# Patient Record
Sex: FEMALE | Race: WHITE | NOT HISPANIC OR LATINO
[De-identification: names, ages, dates, MRNs, and addresses within clinical notes are randomized per-mention and may not be internally consistent; named-entity substitution may affect disease eponyms.]

---

## 2023-06-26 ENCOUNTER — TRANSCRIPTION ENCOUNTER (OUTPATIENT)
Age: 48
End: 2023-06-26

## 2023-06-27 ENCOUNTER — INPATIENT (INPATIENT)
Facility: HOSPITAL | Age: 48
LOS: 0 days | Discharge: HOME | DRG: 812 | End: 2023-06-27
Attending: OBSTETRICS & GYNECOLOGY | Admitting: OBSTETRICS & GYNECOLOGY
Payer: COMMERCIAL

## 2023-06-27 ENCOUNTER — TRANSCRIPTION ENCOUNTER (OUTPATIENT)
Age: 48
End: 2023-06-27

## 2023-06-27 VITALS
RESPIRATION RATE: 16 BRPM | DIASTOLIC BLOOD PRESSURE: 67 MMHG | SYSTOLIC BLOOD PRESSURE: 106 MMHG | HEART RATE: 80 BPM | OXYGEN SATURATION: 99 % | TEMPERATURE: 99 F

## 2023-06-27 VITALS
DIASTOLIC BLOOD PRESSURE: 83 MMHG | HEART RATE: 103 BPM | TEMPERATURE: 98 F | RESPIRATION RATE: 17 BRPM | SYSTOLIC BLOOD PRESSURE: 121 MMHG | HEIGHT: 65 IN | WEIGHT: 119.93 LBS | OXYGEN SATURATION: 99 %

## 2023-06-27 LAB
ACANTHOCYTES BLD QL SMEAR: SLIGHT — SIGNIFICANT CHANGE UP
ALBUMIN SERPL ELPH-MCNC: 4.4 G/DL — SIGNIFICANT CHANGE UP (ref 3.3–5)
ALP SERPL-CCNC: 70 U/L — SIGNIFICANT CHANGE UP (ref 40–120)
ALT FLD-CCNC: 10 U/L — SIGNIFICANT CHANGE UP (ref 10–45)
ANION GAP SERPL CALC-SCNC: 8 MMOL/L — SIGNIFICANT CHANGE UP (ref 5–17)
ANISOCYTOSIS BLD QL: SLIGHT — SIGNIFICANT CHANGE UP
APTT BLD: 26.1 SEC — LOW (ref 27.5–35.5)
AST SERPL-CCNC: 17 U/L — SIGNIFICANT CHANGE UP (ref 10–40)
BASOPHILS # BLD AUTO: 0 K/UL — SIGNIFICANT CHANGE UP (ref 0–0.2)
BASOPHILS NFR BLD AUTO: 0 % — SIGNIFICANT CHANGE UP (ref 0–2)
BILIRUB SERPL-MCNC: 0.4 MG/DL — SIGNIFICANT CHANGE UP (ref 0.2–1.2)
BLD GP AB SCN SERPL QL: NEGATIVE — SIGNIFICANT CHANGE UP
BUN SERPL-MCNC: 15 MG/DL — SIGNIFICANT CHANGE UP (ref 7–23)
CALCIUM SERPL-MCNC: 8.6 MG/DL — SIGNIFICANT CHANGE UP (ref 8.4–10.5)
CHLORIDE SERPL-SCNC: 104 MMOL/L — SIGNIFICANT CHANGE UP (ref 96–108)
CO2 SERPL-SCNC: 25 MMOL/L — SIGNIFICANT CHANGE UP (ref 22–31)
CREAT SERPL-MCNC: 0.77 MG/DL — SIGNIFICANT CHANGE UP (ref 0.5–1.3)
DACRYOCYTES BLD QL SMEAR: SLIGHT — SIGNIFICANT CHANGE UP
EGFR: 96 ML/MIN/1.73M2 — SIGNIFICANT CHANGE UP
ELLIPTOCYTES BLD QL SMEAR: SLIGHT — SIGNIFICANT CHANGE UP
EOSINOPHIL # BLD AUTO: 0.03 K/UL — SIGNIFICANT CHANGE UP (ref 0–0.5)
EOSINOPHIL NFR BLD AUTO: 0.8 % — SIGNIFICANT CHANGE UP (ref 0–6)
GIANT PLATELETS BLD QL SMEAR: PRESENT — SIGNIFICANT CHANGE UP
GLUCOSE SERPL-MCNC: 92 MG/DL — SIGNIFICANT CHANGE UP (ref 70–99)
HCG SERPL-ACNC: <0 MIU/ML — SIGNIFICANT CHANGE UP
HCT VFR BLD CALC: 20 % — CRITICAL LOW (ref 34.5–45)
HGB BLD-MCNC: 6.1 G/DL — CRITICAL LOW (ref 11.5–15.5)
HYPOCHROMIA BLD QL: SIGNIFICANT CHANGE UP
INR BLD: 1.03 — SIGNIFICANT CHANGE UP (ref 0.88–1.16)
LYMPHOCYTES # BLD AUTO: 1.85 K/UL — SIGNIFICANT CHANGE UP (ref 1–3.3)
LYMPHOCYTES # BLD AUTO: 44 % — SIGNIFICANT CHANGE UP (ref 13–44)
MACROCYTES BLD QL: SLIGHT — SIGNIFICANT CHANGE UP
MANUAL SMEAR VERIFICATION: SIGNIFICANT CHANGE UP
MCHC RBC-ENTMCNC: 23.7 PG — LOW (ref 27–34)
MCHC RBC-ENTMCNC: 30.5 GM/DL — LOW (ref 32–36)
MCV RBC AUTO: 77.8 FL — LOW (ref 80–100)
MICROCYTES BLD QL: SLIGHT — SIGNIFICANT CHANGE UP
MONOCYTES # BLD AUTO: 0.11 K/UL — SIGNIFICANT CHANGE UP (ref 0–0.9)
MONOCYTES NFR BLD AUTO: 2.6 % — SIGNIFICANT CHANGE UP (ref 2–14)
NEUTROPHILS # BLD AUTO: 2.21 K/UL — SIGNIFICANT CHANGE UP (ref 1.8–7.4)
NEUTROPHILS NFR BLD AUTO: 52.6 % — SIGNIFICANT CHANGE UP (ref 43–77)
OVALOCYTES BLD QL SMEAR: SIGNIFICANT CHANGE UP
PLAT MORPH BLD: NORMAL — SIGNIFICANT CHANGE UP
PLATELET # BLD AUTO: 442 K/UL — HIGH (ref 150–400)
POIKILOCYTOSIS BLD QL AUTO: SLIGHT — SIGNIFICANT CHANGE UP
POLYCHROMASIA BLD QL SMEAR: SLIGHT — SIGNIFICANT CHANGE UP
POTASSIUM SERPL-MCNC: 3.8 MMOL/L — SIGNIFICANT CHANGE UP (ref 3.5–5.3)
POTASSIUM SERPL-SCNC: 3.8 MMOL/L — SIGNIFICANT CHANGE UP (ref 3.5–5.3)
PROT SERPL-MCNC: 6.6 G/DL — SIGNIFICANT CHANGE UP (ref 6–8.3)
PROTHROM AB SERPL-ACNC: 12.3 SEC — SIGNIFICANT CHANGE UP (ref 10.5–13.4)
RBC # BLD: 2.57 M/UL — LOW (ref 3.8–5.2)
RBC # FLD: 17 % — HIGH (ref 10.3–14.5)
RBC BLD AUTO: ABNORMAL
RH IG SCN BLD-IMP: POSITIVE — SIGNIFICANT CHANGE UP
RH IG SCN BLD-IMP: POSITIVE — SIGNIFICANT CHANGE UP
SODIUM SERPL-SCNC: 137 MMOL/L — SIGNIFICANT CHANGE UP (ref 135–145)
SPHEROCYTES BLD QL SMEAR: SLIGHT — SIGNIFICANT CHANGE UP
WBC # BLD: 4.2 K/UL — SIGNIFICANT CHANGE UP (ref 3.8–10.5)
WBC # FLD AUTO: 4.2 K/UL — SIGNIFICANT CHANGE UP (ref 3.8–10.5)

## 2023-06-27 PROCEDURE — 76830 TRANSVAGINAL US NON-OB: CPT

## 2023-06-27 PROCEDURE — 76856 US EXAM PELVIC COMPLETE: CPT | Mod: 26

## 2023-06-27 PROCEDURE — 85025 COMPLETE CBC W/AUTO DIFF WBC: CPT

## 2023-06-27 PROCEDURE — 99285 EMERGENCY DEPT VISIT HI MDM: CPT

## 2023-06-27 PROCEDURE — 86901 BLOOD TYPING SEROLOGIC RH(D): CPT

## 2023-06-27 PROCEDURE — 86850 RBC ANTIBODY SCREEN: CPT

## 2023-06-27 PROCEDURE — 76856 US EXAM PELVIC COMPLETE: CPT

## 2023-06-27 PROCEDURE — 85730 THROMBOPLASTIN TIME PARTIAL: CPT

## 2023-06-27 PROCEDURE — P9016: CPT

## 2023-06-27 PROCEDURE — 99285 EMERGENCY DEPT VISIT HI MDM: CPT | Mod: 25

## 2023-06-27 PROCEDURE — 86923 COMPATIBILITY TEST ELECTRIC: CPT

## 2023-06-27 PROCEDURE — 85610 PROTHROMBIN TIME: CPT

## 2023-06-27 PROCEDURE — 36415 COLL VENOUS BLD VENIPUNCTURE: CPT

## 2023-06-27 PROCEDURE — 80053 COMPREHEN METABOLIC PANEL: CPT

## 2023-06-27 PROCEDURE — 84702 CHORIONIC GONADOTROPIN TEST: CPT

## 2023-06-27 PROCEDURE — 36430 TRANSFUSION BLD/BLD COMPNT: CPT

## 2023-06-27 PROCEDURE — 86900 BLOOD TYPING SEROLOGIC ABO: CPT

## 2023-06-27 PROCEDURE — 76830 TRANSVAGINAL US NON-OB: CPT | Mod: 26

## 2023-06-27 NOTE — CONSULT NOTE ADULT - SUBJECTIVE AND OBJECTIVE BOX
47y  presenting with anemia due to vaginal bleeding for six weeks. Patient started a menstrual cycle on  and has continued to bleed heavily since. She had a short break from bleeding from -, then it resumed. She reports using an ultra tampon every 2 hours. The tampon is not always soaked but it is bright red blood. She had also noticed some tissue pieces with the bleeding in May. Yesterday she was feeling very lightheaded and SOB as well as fatigued. She went to an urgent care, where a speculum exam was performed and wnl. Labs were drawn and the patient went home. She awoke at 01:30 to the police knocking on her door with an ambulance, as a nurse at urgent care had attempted to call her without response. Patient was shaken but otherwise still feeling fatigued.     OB H/x: G1- c/s   GYN H/x: Denies hx of fibroids, abnormal pap smears, STIs, ovarian cysts. Regular menses prior to this episode.    MED H/x: none  SURG H/x: none  Medications: ozempic weekly (for weight loss)  Allergies: NKDA    Vital Signs Last 24 Hrs  T(C): 36.8 (2023 02:29), Max: 36.8 (2023 02:29)  T(F): 98.3 (2023 02:29), Max: 98.3 (2023 02:29)  HR: 103 (2023 02:29) (103 - 103)  BP: 121/83 (2023 02:29) (121/83 - 121/83)  BP(mean): --  RR: 17 (2023 02:29) (17 - 17)  SpO2: 99% (2023 02:29) (99% - 99%)    Parameters below as of 2023 02:29  Patient On (Oxygen Delivery Method): room air        Physical Exam:  Gen: NAD, comfortable  GI: soft, nontender, nondistended, no rebound no guarding  BME: normal anteverted uterus, no adnexal masses appreciated , No cervical motion tenderness   Spec: closed cervix without active bleeding, minimal blood in vault  Ext: no edema, erythema, tenderness     LABS:                        6.1    4.20  )-----------( 442      ( 2023 03:08 )             20.0     06-    137  |  104  |  15  ----------------------------<  92  3.8   |  25  |  0.77    Ca    8.6      2023 03:08    TPro  6.6  /  Alb  4.4  /  TBili  0.4  /  DBili  x   /  AST  17  /  ALT  10  /  AlkPhos  70      PT/INR - ( 2023 03:08 )   PT: 12.3 sec;   INR: 1.03          PTT - ( 2023 03:08 )  PTT:26.1 sec  Urinalysis Basic - ( 2023 03:08 )    Color: x / Appearance: x / SG: x / pH: x  Gluc: 92 mg/dL / Ketone: x  / Bili: x / Urobili: x   Blood: x / Protein: x / Nitrite: x   Leuk Esterase: x / RBC: x / WBC x   Sq Epi: x / Non Sq Epi: x / Bacteria: x        RADIOLOGY & ADDITIONAL STUDIES:     47y  presenting with anemia due to vaginal bleeding for six weeks. Patient started a menstrual cycle on  and has continued to bleed heavily since. She had a short break from bleeding from -, then it resumed. She reports using an ultra tampon every 2 hours. The tampon is not always soaked but it is bright red blood. She had also noticed some tissue pieces with the bleeding in May. Yesterday she was feeling very lightheaded and SOB as well as fatigued. She went to an urgent care, where a speculum exam was performed and wnl. Labs were drawn and the patient went home. She awoke at 01:30 to the police knocking on her door with an ambulance, as a nurse at urgent care had attempted to call her without response. Patient was shaken but otherwise still feeling fatigued.     OB H/x: G1- c/s   GYN H/x: Denies hx of fibroids, abnormal pap smears, STIs, ovarian cysts. Regular menses prior to this episode.    MED H/x: none  SURG H/x: none  Medications: ozempic weekly (for weight loss)  Allergies: NKDA    Vital Signs Last 24 Hrs  T(C): 36.8 (2023 02:29), Max: 36.8 (2023 02:29)  T(F): 98.3 (2023 02:29), Max: 98.3 (2023 02:29)  HR: 103 (2023 02:29) (103 - 103)  BP: 121/83 (2023 02:29) (121/83 - 121/83)  BP(mean): --  RR: 17 (2023 02:29) (17 - 17)  SpO2: 99% (2023 02:29) (99% - 99%)    Parameters below as of 2023 02:29  Patient On (Oxygen Delivery Method): room air        Physical Exam:  Gen: NAD, comfortable  GI: soft, nontender, nondistended, no rebound no guarding  BME: normal anteverted uterus, no adnexal masses appreciated , No cervical motion tenderness   Spec: closed cervix without active bleeding, minimal blood in vault  Ext: no edema, erythema, tenderness     LABS:                        6.1    4.20  )-----------( 442      ( 2023 03:08 )             20.0         137  |  104  |  15  ----------------------------<  92  3.8   |  25  |  0.77    Ca    8.6      2023 03:08    TPro  6.6  /  Alb  4.4  /  TBili  0.4  /  DBili  x   /  AST  17  /  ALT  10  /  AlkPhos  70      PT/INR - ( 2023 03:08 )   PT: 12.3 sec;   INR: 1.03          PTT - ( 2023 03:08 )  PTT:26.1 sec  Urinalysis Basic - ( 2023 03:08 )    Color: x / Appearance: x / SG: x / pH: x  Gluc: 92 mg/dL / Ketone: x  / Bili: x / Urobili: x   Blood: x / Protein: x / Nitrite: x   Leuk Esterase: x / RBC: x / WBC x   Sq Epi: x / Non Sq Epi: x / Bacteria: x        RADIOLOGY & ADDITIONAL STUDIES:  < from: US Transvaginal (23 @ 05:18) >  ******PRELIMINARY REPORT******      ******PRELIMINARY REPORT******         ACC: [id number] EXAM:  US PELVIC COMPLETE   ORDERED BY: SOO ROBERTO     ACC: [id number] EXAM:  US TRANSVAGINAL   ORDERED BY: SOO ROBERTO     PROCEDURE DATE:  2023  ******PRELIMINARY REPORT******      ******PRELIMINARY REPORT******           INTERPRETATION:  CLINICAL INFORMATION: Vaginal bleeding    LMP: 2023    COMPARISON: None available.    TECHNIQUE:  Endovaginal and transabdominal pelvic sonogram. Color and Spectral   Doppler was performed.    FINDINGS:  Uterus: 8.8 x 5.2 x 6.6 cm. Within normal limits.  Endometrium: Thickened, 2.3 cm. There is a 1.0 x 0.4 cm hyperechoic focus   in the anterior endometrium with suggestion of a vascular stalk on color   Doppler imaging.  Cervix: A few small nabothian cysts.    Right ovary: 5.2 x 3.5 x 3.8 cm, volume 36 mL. 3.6 x 3.0 x 3.1 cm simple   cyst. Normal arterial and venous waveforms.  Left ovary: 3.5 x 2.0 x 2.7 cm, volume 10 mL. Within normal limits.   Normal arterial and venous waveforms.    Fluid: None.    IMPRESSION:    1. Thickened endometrium with asymmetric 1.0 cm hyperechoic focus   demonstrating a vascular stalk on color Doppler, suggestive of   endometrial polyp.    2. Right ovarian cyst,3.6cm.    < end of copied text >

## 2023-06-27 NOTE — H&P ADULT - HISTORY OF PRESENT ILLNESS
47y  presenting with anemia due to vaginal bleeding for six weeks. Patient started a menstrual cycle on  and has continued to bleed heavily since. She had a short break from bleeding from -, then it resumed. She reports using an ultra tampon every 2 hours. The tampon is not always soaked but it is bright red blood. She had also noticed some tissue pieces with the bleeding in May. Yesterday she was feeling very lightheaded and SOB as well as fatigued. She went to an urgent care, where a speculum exam was performed and wnl. Labs were drawn and the patient went home. She awoke at 01:30 to the police knocking on her door with an ambulance, as a nurse at urgent care had attempted to call her without response. Patient was shaken but otherwise still feeling fatigued.     OB H/x: G1- c/s   GYN H/x: Denies hx of fibroids, abnormal pap smears, STIs, ovarian cysts. Regular menses prior to this episode.    MED H/x: none  SURG H/x: none  Medications: ozempic weekly (for weight loss)  Allergies: NKDA

## 2023-06-27 NOTE — ED PROVIDER NOTE - NS ED ATTENDING STATEMENT MOD
This was a shared visit with the HOMERO. I reviewed and verified the documentation and independently performed the documented:

## 2023-06-27 NOTE — ED ADULT TRIAGE NOTE - CHIEF COMPLAINT QUOTE
Pt presetns ambulatory via EMS from home. Seen at  yesterday (6/26) for c/o "vaginal bleeding x 6 weeks". Pt denies any pain or cramping, states "I just thought I was pippa-menopausal". Notified this AM that her Hg low and referred to ER.

## 2023-06-27 NOTE — H&P ADULT - NSHPPHYSICALEXAM_GEN_ALL_CORE
Vital Signs Last 24 Hrs  T(C): 36.8 (27 Jun 2023 02:29), Max: 36.8 (27 Jun 2023 02:29)  T(F): 98.3 (27 Jun 2023 02:29), Max: 98.3 (27 Jun 2023 02:29)  HR: 103 (27 Jun 2023 02:29) (103 - 103)  BP: 121/83 (27 Jun 2023 02:29) (121/83 - 121/83)  BP(mean): --  RR: 17 (27 Jun 2023 02:29) (17 - 17)  SpO2: 99% (27 Jun 2023 02:29) (99% - 99%)    Parameters below as of 27 Jun 2023 02:29  Patient On (Oxygen Delivery Method): room air        Physical Exam:  Gen: NAD, comfortable  GI: soft, nontender, nondistended, no rebound no guarding  BME: normal anteverted uterus, no adnexal masses appreciated , No cervical motion tenderness   Spec: closed cervix without active bleeding, minimal blood in vault  Ext: no edema, erythema, tenderness

## 2023-06-27 NOTE — ED ADULT NURSE NOTE - OBJECTIVE STATEMENT
Pt presents to ER c/o abnormal Hgb (lab draw from urgent care) d/t vaginal bleeding for ~6 weeks., Pt A&O x3, calm and cooperative, airway patent, respirations regular, non-labored, lungs clear bilaterally to auscultation, abdomen soft non-tender, normoactive bowel sounds noted in all quadrants, strong palpable peripheral pulses noted, skin warm dry intact. Pt waiting ER provider evaluation.

## 2023-06-27 NOTE — ED ADULT NURSE NOTE - NSFALLUNIVINTERV_ED_ALL_ED
Bed/Stretcher in lowest position, wheels locked, appropriate side rails in place/Call bell, personal items and telephone in reach/Instruct patient to call for assistance before getting out of bed/chair/stretcher/Non-slip footwear applied when patient is off stretcher/[place] to call system/Physically safe environment - no spills, clutter or unnecessary equipment/Purposeful proactive rounding/Room/bathroom lighting operational, light cord in reach

## 2023-06-27 NOTE — DISCHARGE NOTE PROVIDER - NSDCFUADDINST_GEN_ALL_CORE_FT
-follow up with Dr. Arroyo at appointment on 7/5  -ultrasound showed possible endometrial polyp, consider hysteroscopic evaluation

## 2023-06-27 NOTE — ED ADULT NURSE NOTE - SUICIDE SCREENING QUESTION 1
PATIENT HAS BEEN TRYING TO GET [facility] CALLED INTO [facility] [phone]. PLEASE CALL IN TODAY   No

## 2023-06-27 NOTE — DISCHARGE NOTE PROVIDER - HOSPITAL COURSE
47y  presenting with anemia due to vaginal bleeding for six weeks, found to have Hgb 6.1 and TVUS with thickened endometrium and endometrial polyp. She received 2 U pRBC and was discharged clinically and hemodynamically stable with outpatient follow up scheduled  with Dr. Arroyo.

## 2023-06-27 NOTE — ED PROVIDER NOTE - PROGRESS NOTE DETAILS
hgb 6.1 / hct 20. labs otherwise ok.   US "IMPRESSION:  1. Thickened endometrium with asymmetric 1.0 cm hyperechoic focus   demonstrating a vascular stalk on color Doppler, suggestive of   endometrial polyp.  2. Right ovarian cyst, 3.6cm."    discussed w gyn. will transfuse 2un. vitals stable. pt not actively bleeding on gyn exam.     All results reviewed with the patient verbally. Discharge plan and return precautions d/w pt who verbalized understanding and agrees with plan. All questions answered. Vitals WNL. Ready for d/c.

## 2023-06-27 NOTE — ED PROVIDER NOTE - PHYSICAL EXAMINATION
Constitutional : Well appearing, non-toxic, no acute distress. awake, alert, oriented to person, place, time/situation.  Head : head normocephalic, atraumatic  EENMT : eyes clear bilaterally, PERRL, EOMI. airway patent. moist mucous membranes. neck supple.  Cardiac : Normal rate, regular rhythm. No murmur appreciated, no LE edema.  Resp : Breath sounds clear and equal bilaterally. Respirations even and unlabored.   Gastro : abdomen soft, nontender, nondistended. no rebound or guarding.   MSK :  range of motion is not limited, no muscle or joint tenderness  Vasc : Extremities warm and well perfused. 2+ radial and DP pulses. cap refill <2 seconds  Neuro : Alert and oriented, CNII-XII grossly intact, no focal deficits, no motor or sensory deficits.  Skin : Skin normal color for race, warm, dry and intact. No evidence of rash.  Psych : Alert and oriented to person, place, time/situation. normal mood and affect. no apparent risk to self or others.

## 2023-06-27 NOTE — DISCHARGE NOTE NURSING/CASE MANAGEMENT/SOCIAL WORK - PATIENT PORTAL LINK FT
You can access the FollowMyHealth Patient Portal offered by [facility] by registering at the following website: [email or web address]. By joining [email or web address] FollowMyHealth portal, you will also be able to view your health information using other applications (apps) compatible with our system.

## 2023-06-27 NOTE — ED PROVIDER NOTE - CLINICAL SUMMARY MEDICAL DECISION MAKING FREE TEXT BOX
otherwise healthy, here w ongoing vaginal bleeding x 6 weeks. feeling fatigued, lightheaded. outpt labs from urgent care yesterday showing low hemoglobin.   pt well appearing, stable vitals, exam reassuring - pelvic deferred.   anemia likely 2/2 vaginal bleeding  will check labs, transfuse prn

## 2023-06-27 NOTE — ED PROVIDER NOTE - OBJECTIVE STATEMENT
47 yr old female, denies medical history, presents to the Emergency Department w vaginal bleeding / anemia. 47 yr old female, denies medical history, presents to the Emergency Department w vaginal bleeding / anemia. pt w ongoing vaginal bleeding x 6 weeks. initially started menses on 5/9, ongoing bleeding since. had few days of not bleeding beginning of june. in last few weeks changing tampon q few hours. not always fully saturated but bright red blood and intermittently having some tissue pass. yesterday feeling lightheaded and fatigued. went to urgent care had labs done. woken overnight by police sent from urgent care for abnormal labs showing low hemoglobin. previously regular menses w/o heavy bleeding.  no cp, sob, syncope, dark / bloody stools.

## 2023-06-27 NOTE — H&P ADULT - NSHPLABSRESULTS_GEN_ALL_CORE
LABS:                        6.1    4.20  )-----------( 442      ( 27 Jun 2023 03:08 )             20.0     06-27    137  |  104  |  15  ----------------------------<  92  3.8   |  25  |  0.77    Ca    8.6      27 Jun 2023 03:08    TPro  6.6  /  Alb  4.4  /  TBili  0.4  /  DBili  x   /  AST  17  /  ALT  10  /  AlkPhos  70  06-27    PT/INR - ( 27 Jun 2023 03:08 )   PT: 12.3 sec;   INR: 1.03          PTT - ( 27 Jun 2023 03:08 )  PTT:26.1 sec  Urinalysis Basic - ( 27 Jun 2023 03:08 )    Color: x / Appearance: x / SG: x / pH: x  Gluc: 92 mg/dL / Ketone: x  / Bili: x / Urobili: x   Blood: x / Protein: x / Nitrite: x   Leuk Esterase: x / RBC: x / WBC x   Sq Epi: x / Non Sq Epi: x / Bacteria: x        RADIOLOGY & ADDITIONAL STUDIES:  < from: US Transvaginal (06.27.23 @ 05:18) >  ******PRELIMINARY REPORT******      ******PRELIMINARY REPORT******         ACC: [id number] EXAM:  US PELVIC COMPLETE   ORDERED BY: SOO ROBERTO     ACC: [id number] EXAM:  US TRANSVAGINAL   ORDERED BY: SOO ROBERTO     PROCEDURE DATE:  06/27/2023  ******PRELIMINARY REPORT******      ******PRELIMINARY REPORT******           INTERPRETATION:  CLINICAL INFORMATION: Vaginal bleeding    LMP: 5/9/2023    COMPARISON: None available.    TECHNIQUE:  Endovaginal and transabdominal pelvic sonogram. Color and Spectral   Doppler was performed.    FINDINGS:  Uterus: 8.8 x 5.2 x 6.6 cm. Within normal limits.  Endometrium: Thickened, 2.3 cm. There is a 1.0 x 0.4 cm hyperechoic focus   in the anterior endometrium with suggestion of a vascular stalk on color   Doppler imaging.  Cervix: A few small nabothian cysts.    Right ovary: 5.2 x 3.5 x 3.8 cm, volume 36 mL. 3.6 x 3.0 x 3.1 cm simple   cyst. Normal arterial and venous waveforms.  Left ovary: 3.5 x 2.0 x 2.7 cm, volume 10 mL. Within normal limits.   Normal arterial and venous waveforms.    Fluid: None.    IMPRESSION:    1. Thickened endometrium with asymmetric 1.0 cm hyperechoic focus   demonstrating a vascular stalk on color Doppler, suggestive of   endometrial polyp.    2. Right ovarian cyst,3.6cm.

## 2023-06-27 NOTE — CONSULT NOTE ADULT - ASSESSMENT
47y  presenting with anemia due to vaginal bleeding for six weeks.    -pending TVUS    ---INCOMPLETE--- 47y  presenting with anemia due to vaginal bleeding for six weeks, found to have Hgb 6.1 and TVUS with thickened endometrium and endometrial polyp, clinically and hemodynamically stable.    #anemia 2/2 vaginal bleeding  -patient without heavy active bleeding at this time; would not recommend further medical management     47y  presenting with anemia due to vaginal bleeding for six weeks, found to have Hgb 6.1 and TVUS with thickened endometrium and endometrial polyp, clinically and hemodynamically stable.    #anemia 2/2 vaginal bleeding  -patient without heavy active bleeding at this time; would not recommend further medical management  -TVUS prelim read with thickened endometrium and vascularity suggestive of endometrial polyp  -recommend 2 U pRBC  -admit to GYN under Dana Newman MD  -patient to f/u outpatient with Dr. Arroyo (GYN) on ; recommend discussion of management of endometrial polyp    D/W Dr. Newman and Dr. Hines, PGY3

## 2023-06-27 NOTE — H&P ADULT - ASSESSMENT
47y  presenting with anemia due to vaginal bleeding for six weeks, found to have Hgb 6.1 and TVUS with thickened endometrium and endometrial polyp, clinically and hemodynamically stable.    #anemia 2/2 vaginal bleeding  -patient without heavy active bleeding at this time; would not recommend further medical management  -TVUS prelim read with thickened endometrium and vascularity suggestive of endometrial polyp  -recommend 2 U pRBC  -admit to GYN under Dana Newman MD  -patient to f/u outpatient with Dr. Arroyo (GYN) on ; recommend discussion of management of endometrial polyp    D/W Dr. Newman and Dr. Hines, PGY3 47y  presenting with anemia due to vaginal bleeding for six weeks, found to have Hgb 6.1 and TVUS with thickened endometrium and endometrial polyp, clinically and hemodynamically stable.    #anemia 2/2 vaginal bleeding  -patient without heavy active bleeding at this time; would not recommend further medical management  -TVUS prelim read with thickened endometrium and vascularity suggestive of endometrial polyp  -recommend 2 U pRBC  -admit to GYN under Dana Newman MD  -patient to f/u outpatient with Dr. Arroyo (GYN) on ; recommend discussion of management of endometrial polyp    D/W Dr. Newman and Dr. Hines, PGY3      Addendum:   Pt seen at bedside at completion of 2UPRBCs. Pt feeling very well and has been ambulating without difficulty around the ER. Pt denies dizziness, SOB, CP at this time. Pt informed of her TVUS results and understands that outpatient follow up is necessary to manage polyp.   [] Pt may be discharged home w/ return precautions - SOB, CP, vaginal bleeding (more than a pad per hour x 2 hours), dizziness, syncope  [] Pt has follow up appointment with her OB/GYN on 23. Pt given info for [facility] (Insurance is [employer])- Dr. Cook #[phone]  [] Pt understands counseling and importance of follow up and plans to see her doctor within 1 week to schedule outpatient management

## 2023-06-27 NOTE — DISCHARGE NOTE NURSING/CASE MANAGEMENT/SOCIAL WORK - NSDCPEFALRISK_GEN_ALL_CORE
For information on Fall & Injury Prevention, visit: [email or web address] OR  [email or web address] OR  https://www.cdc.gov/steadi/patient.html

## 2023-07-03 ENCOUNTER — APPOINTMENT (OUTPATIENT)
Dept: OBGYN | Facility: CLINIC | Age: 48
End: 2023-07-03
Payer: COMMERCIAL

## 2023-07-03 VITALS — DIASTOLIC BLOOD PRESSURE: 76 MMHG | HEART RATE: 106 BPM | SYSTOLIC BLOOD PRESSURE: 115 MMHG | OXYGEN SATURATION: 99 %

## 2023-07-03 DIAGNOSIS — Z92.89 PERSONAL HISTORY OF OTHER MEDICAL TREATMENT: ICD-10-CM

## 2023-07-03 DIAGNOSIS — N93.9 ABNORMAL UTERINE AND VAGINAL BLEEDING, UNSPECIFIED: ICD-10-CM

## 2023-07-03 PROCEDURE — 58100 BIOPSY OF UTERUS LINING: CPT | Mod: 58,22

## 2023-07-03 PROCEDURE — 99203 OFFICE O/P NEW LOW 30 MIN: CPT | Mod: 25

## 2023-07-03 NOTE — PROCEDURE
[Endometrial Biopsy] : Endometrial biopsy [Consent Obtained] : Consent obtained [Irregular Bleeding] : irregular uterine bleeding [Risks] : risks [Benefits] : benefits [Alternatives] : alternatives [Patient] : patient [Infection] : infection [Uterine Perforation] : uterine perforation [Pain] : pain [Negative] : negative pregnancy test [Ibuprofen ___ mg] : ibuprofen [unfilled] ~Umg [Betadine] : Betadine [None] : none [Tenaculum] : Tenaculum [Required Dilation] : required dilation [Moderate] : moderate [Specimen Collected] : collected [Sent to Pathology] : placed in buffered formalin and sent for pathology [Tolerated Well] : Patient tolerated the procedure well [de-identified] : Difficult to dilate [de-identified] : After informed consent was obtained, the patient was positioned in dorsal lithotomy position in stirrups. A sterile speculum was used to expose the cervix which was cleaned with betadine. The patient was instructed to cough and simultaneously a single tooth tenaculum was used to grasp the anterior lip of the cervix. Using the tenaculum for stabilization, the endometrial biopsy pipelle was attempted to be passed through the cervix and into the endometrial cavity. Significant resistance was met at the internal os and us the os finder was used to dilate the cervix. The pipelle was reinserted and attempted to be passed into the cavity. The suction was activated and the pipelle was withdrawn in a undulating motion. The tissue was then placed into the specimen container. Two additional passes were performed in the same manner. The tenaculum was removed from the cervix under direct visualization. The tenaculum sites were hemostatic after application of silver nitrate. The patient tolerated the procedure well. [de-identified] : Difficulty passing through the internal os

## 2023-07-03 NOTE — PLAN
[FreeTextEntry1] : Ms. Santiago presents for evaluation of abnormal uterine bleeding after being evaluated in the [facility] ED for symptomatic anemia in the setting of prolonged AUB. \par - Vitals reviewed and within normal limits. \par - Hospital records reviewed in depth. \par -  Pelvic exam performed today. Moderate bleeding noted. \par - EMB performed - patient counseled that the cavity may not have been sampled thoroughly secondary to difficulty passing the internal os. \par - Patient informed that she may likely need a D&C. \par - Will send repeat CBC as patient has continued to bleed moderately-heavily. \par \par Return to the office pending results and as needed for GYN concerns. Plan of care discussed with patient who has no additional questions and verbalized agreement.\par

## 2023-07-03 NOTE — HISTORY OF PRESENT ILLNESS
[Patient reported PAP Smear was normal] : Patient reported PAP Smear was normal [Previously active] : previously active [PapSmeardate] : 2022 [TextBox_31] : pt denies ever having abnormal pap [FreeTextEntry1] : 05/

## 2023-07-03 NOTE — PHYSICAL EXAM
[Chaperone Present] : A chaperone was present in the examining room during all aspects of the physical examination [Appropriately responsive] : appropriately responsive [Alert] : alert [No Acute Distress] : no acute distress [Oriented x3] : oriented x3 [Labia Majora] : normal [Labia Minora] : normal [Moderate] : There was moderate vaginal bleeding [Normal] : normal [Cervical Stenosis] : cervical stenosis [FreeTextEntry5] : ongoing moderate bleeding from cervical os

## 2023-07-04 LAB
BASOPHILS # BLD AUTO: 0.04 K/UL
BASOPHILS NFR BLD AUTO: 0.7 %
EOSINOPHIL # BLD AUTO: 0.07 K/UL
EOSINOPHIL NFR BLD AUTO: 1.2 %
HCT VFR BLD CALC: 26.8 %
HGB BLD-MCNC: 7.7 G/DL
IMM GRANULOCYTES NFR BLD AUTO: 0.3 %
LYMPHOCYTES # BLD AUTO: 1.45 K/UL
LYMPHOCYTES NFR BLD AUTO: 24.7 %
MAN DIFF?: NORMAL
MCHC RBC-ENTMCNC: 24.1 PG
MCHC RBC-ENTMCNC: 28.7 GM/DL
MCV RBC AUTO: 83.8 FL
MONOCYTES # BLD AUTO: 0.48 K/UL
MONOCYTES NFR BLD AUTO: 8.2 %
NEUTROPHILS # BLD AUTO: 3.82 K/UL
NEUTROPHILS NFR BLD AUTO: 64.9 %
PLATELET # BLD AUTO: 405 K/UL
RBC # BLD: 3.2 M/UL
RBC # FLD: 17.1 %
WBC # FLD AUTO: 5.88 K/UL

## 2023-07-06 DIAGNOSIS — D50.0 IRON DEFICIENCY ANEMIA SECONDARY TO BLOOD LOSS (CHRONIC): ICD-10-CM

## 2023-07-06 DIAGNOSIS — N93.9 ABNORMAL UTERINE AND VAGINAL BLEEDING, UNSPECIFIED: ICD-10-CM

## 2023-07-06 DIAGNOSIS — N84.0 POLYP OF CORPUS UTERI: ICD-10-CM

## 2023-07-18 RX ORDER — NORETHINDRONE ACETATE 5 MG/1
5 TABLET ORAL DAILY
Qty: 8 | Refills: 0 | Status: ACTIVE | COMMUNITY
Start: 2023-07-18 | End: 1900-01-01

## 2023-07-21 ENCOUNTER — APPOINTMENT (OUTPATIENT)
Dept: INTERNAL MEDICINE | Facility: CLINIC | Age: 48
End: 2023-07-21
Payer: COMMERCIAL

## 2023-07-21 ENCOUNTER — NON-APPOINTMENT (OUTPATIENT)
Age: 48
End: 2023-07-21

## 2023-07-21 ENCOUNTER — LABORATORY RESULT (OUTPATIENT)
Age: 48
End: 2023-07-21

## 2023-07-21 VITALS — HEART RATE: 84 BPM | DIASTOLIC BLOOD PRESSURE: 71 MMHG | SYSTOLIC BLOOD PRESSURE: 114 MMHG | OXYGEN SATURATION: 97 %

## 2023-07-21 VITALS — HEIGHT: 66 IN | BODY MASS INDEX: 21.21 KG/M2 | WEIGHT: 132 LBS | TEMPERATURE: 97.2 F

## 2023-07-21 DIAGNOSIS — Z01.818 ENCOUNTER FOR OTHER PREPROCEDURAL EXAMINATION: ICD-10-CM

## 2023-07-21 PROCEDURE — 99203 OFFICE O/P NEW LOW 30 MIN: CPT | Mod: 25

## 2023-07-21 PROCEDURE — 36415 COLL VENOUS BLD VENIPUNCTURE: CPT

## 2023-07-21 PROCEDURE — 93000 ELECTROCARDIOGRAM COMPLETE: CPT

## 2023-07-24 LAB
BASOPHILS # BLD AUTO: 0.03 K/UL
BASOPHILS NFR BLD AUTO: 0.7 %
EOSINOPHIL # BLD AUTO: 0.06 K/UL
EOSINOPHIL NFR BLD AUTO: 1.4 %
HCT VFR BLD CALC: 25.9 %
HGB BLD-MCNC: 7.2 G/DL
IMM GRANULOCYTES NFR BLD AUTO: 0.2 %
LYMPHOCYTES # BLD AUTO: 1.24 K/UL
LYMPHOCYTES NFR BLD AUTO: 29.5 %
MAN DIFF?: NORMAL
MCHC RBC-ENTMCNC: 23.7 PG
MCHC RBC-ENTMCNC: 27.8 GM/DL
MCV RBC AUTO: 85.2 FL
MONOCYTES # BLD AUTO: 0.35 K/UL
MONOCYTES NFR BLD AUTO: 8.3 %
NEUTROPHILS # BLD AUTO: 2.52 K/UL
NEUTROPHILS NFR BLD AUTO: 59.9 %
PLATELET # BLD AUTO: 529 K/UL
RBC # BLD: 3.04 M/UL
RBC # FLD: 19.1 %
WBC # FLD AUTO: 4.21 K/UL

## 2023-07-24 NOTE — HISTORY OF PRESENT ILLNESS
[No Pertinent Cardiac History] : no history of aortic stenosis, atrial fibrillation, coronary artery disease, recent myocardial infarction, or implantable device/pacemaker [No Pertinent Pulmonary History] : no history of asthma, COPD, sleep apnea, or smoking [No Adverse Anesthesia Reaction] : no adverse anesthesia reaction in self or family member [(Patient denies any chest pain, claudication, dyspnea on exertion, orthopnea, palpitations or syncope)] : Patient denies any chest pain, claudication, dyspnea on exertion, orthopnea, palpitations or syncope [Moderate (4-6 METs)] : Moderate (4-6 METs) [Chronic Anticoagulation] : no chronic anticoagulation [Chronic Kidney Disease] : no chronic kidney disease [Diabetes] : no diabetes [FreeTextEntry1] : hysteroscopy, D&C with polypectomy/myosure [FreeTextEntry2] : 7/26/23 [FreeTextEntry3] : Dr Cook  [FreeTextEntry4] : Ms. KOREY PERKINS is a 47 year old female with history of abnormal uterine bleeding, and anemia presenting today to the [facility] prior to hysteroscopy with polypectomy.  She recently went to [facility] and was sent to the ED for severe anemia. At [facility] Hg was 6.1 and she was transfused. Repeat Hg was 7.7. She has started taking OCP and PO Iron with improvement in her fatigue.  [FreeTextEntry8] : went for a one hour walk yesterday

## 2023-07-24 NOTE — ASSESSMENT
[High Risk Surgery - Intraperitoneal, Intrathoracic or Supringuinal Vascular Procedures] : High Risk Surgery - Intraperitoneal, Intrathoracic or Supringuinal Vascular Procedures - No (0) [Ischemic Heart Disease] : Ischemic Heart Disease - No (0) [Congestive Heart Failure] : Congestive Heart Failure - No (0) [Prior Cerebrovascular Accident or TIA] : Prior Cerebrovascular Accident or TIA - No (0) [Creatinine >= 2mg/dL (1 Point)] : Creatinine >= 2mg/dL - No (0) [Insulin-dependent Diabetic (1 Point)] : Insulin-dependent Diabetic - No (0) [0] : 0 , RCRI Class: I, Risk of Post-Op Cardiac Complications: 3.9%, 95% CI for Risk Estimate: 2.8% - 5.4% [FreeTextEntry4] : Ms. KOREY PERKINS is a 47 year old female with history of abnormal uterine bleeding, and anemia presenting today to the [facility] prior to hysteroscopy with polypectomy. Case discussed with anesthesia (Dr. Castillo); given her lack of comorbidities and age, she is medically optimized for surgery pending repeat H&H. Goal hemoglobin >7.

## 2023-07-24 NOTE — ADDENDUM
[FreeTextEntry1] : Hgb 7.2/Hct 25.9%. Given her age and lack of comorbidities, okay to proceed with low risk procedure with plan to move surgery to [facility] for blood bank support.

## 2023-07-25 ENCOUNTER — TRANSCRIPTION ENCOUNTER (OUTPATIENT)
Age: 48
End: 2023-07-25

## 2023-07-25 NOTE — ASU PATIENT PROFILE, ADULT - NSICDXPASTSURGICALHX_GEN_ALL_CORE_FT
PAST SURGICAL HISTORY:  No significant past surgical history PAST SURGICAL HISTORY:  H/O  section

## 2023-07-26 ENCOUNTER — TRANSCRIPTION ENCOUNTER (OUTPATIENT)
Age: 48
End: 2023-07-26

## 2023-07-26 ENCOUNTER — APPOINTMENT (OUTPATIENT)
Dept: OBGYN | Facility: HOSPITAL | Age: 48
End: 2023-07-26

## 2023-07-26 ENCOUNTER — OUTPATIENT (OUTPATIENT)
Dept: INPATIENT UNIT | Facility: HOSPITAL | Age: 48
LOS: 1 days | Discharge: HOME | End: 2023-07-26
Payer: COMMERCIAL

## 2023-07-26 ENCOUNTER — RESULT REVIEW (OUTPATIENT)
Age: 48
End: 2023-07-26

## 2023-07-26 VITALS
HEIGHT: 66 IN | TEMPERATURE: 98 F | SYSTOLIC BLOOD PRESSURE: 125 MMHG | RESPIRATION RATE: 16 BRPM | OXYGEN SATURATION: 100 % | HEART RATE: 79 BPM | DIASTOLIC BLOOD PRESSURE: 75 MMHG | WEIGHT: 133.6 LBS

## 2023-07-26 VITALS
OXYGEN SATURATION: 98 % | DIASTOLIC BLOOD PRESSURE: 75 MMHG | SYSTOLIC BLOOD PRESSURE: 124 MMHG | RESPIRATION RATE: 19 BRPM | HEART RATE: 71 BPM

## 2023-07-26 DIAGNOSIS — Z98.891 HISTORY OF UTERINE SCAR FROM PREVIOUS SURGERY: Chronic | ICD-10-CM

## 2023-07-26 LAB
BLD GP AB SCN SERPL QL: NEGATIVE — SIGNIFICANT CHANGE UP
RH IG SCN BLD-IMP: POSITIVE — SIGNIFICANT CHANGE UP

## 2023-07-26 PROCEDURE — 58558 HYSTEROSCOPY BIOPSY: CPT

## 2023-07-26 PROCEDURE — 88305 TISSUE EXAM BY PATHOLOGIST: CPT

## 2023-07-26 PROCEDURE — 86850 RBC ANTIBODY SCREEN: CPT

## 2023-07-26 PROCEDURE — 86900 BLOOD TYPING SEROLOGIC ABO: CPT

## 2023-07-26 PROCEDURE — 88305 TISSUE EXAM BY PATHOLOGIST: CPT | Mod: 26

## 2023-07-26 PROCEDURE — 86901 BLOOD TYPING SEROLOGIC RH(D): CPT

## 2023-07-26 DEVICE — MYOSURE TISSUE REMOVAL DEVICE REACH: Type: IMPLANTABLE DEVICE | Status: FUNCTIONAL

## 2023-07-26 RX ORDER — ACETAMINOPHEN 500 MG
1000 TABLET ORAL EVERY 6 HOURS
Refills: 0 | Status: DISCONTINUED | OUTPATIENT
Start: 2023-07-26 | End: 2023-07-26

## 2023-07-26 RX ORDER — OXYCODONE HYDROCHLORIDE 5 MG/1
5 TABLET ORAL
Refills: 0 | Status: DISCONTINUED | OUTPATIENT
Start: 2023-07-26 | End: 2023-07-26

## 2023-07-26 RX ORDER — HYDROMORPHONE HYDROCHLORIDE 2 MG/ML
0.5 INJECTION INTRAMUSCULAR; INTRAVENOUS; SUBCUTANEOUS
Refills: 0 | Status: DISCONTINUED | OUTPATIENT
Start: 2023-07-26 | End: 2023-07-26

## 2023-07-26 RX ORDER — SIMETHICONE 80 MG/1
80 TABLET, CHEWABLE ORAL EVERY 6 HOURS
Refills: 0 | Status: DISCONTINUED | OUTPATIENT
Start: 2023-07-26 | End: 2023-07-26

## 2023-07-26 RX ORDER — KETOROLAC TROMETHAMINE 30 MG/ML
30 SYRINGE (ML) INJECTION EVERY 6 HOURS
Refills: 0 | Status: DISCONTINUED | OUTPATIENT
Start: 2023-07-26 | End: 2023-07-26

## 2023-07-26 RX ORDER — METOCLOPRAMIDE HCL 10 MG
10 TABLET ORAL EVERY 6 HOURS
Refills: 0 | Status: DISCONTINUED | OUTPATIENT
Start: 2023-07-26 | End: 2023-07-26

## 2023-07-26 RX ORDER — ACETAMINOPHEN 500 MG
2 TABLET ORAL
Qty: 0 | Refills: 0 | DISCHARGE
Start: 2023-07-26

## 2023-07-26 RX ORDER — ONDANSETRON 8 MG/1
8 TABLET, FILM COATED ORAL EVERY 6 HOURS
Refills: 0 | Status: DISCONTINUED | OUTPATIENT
Start: 2023-07-26 | End: 2023-07-26

## 2023-07-26 RX ORDER — SODIUM CHLORIDE 9 MG/ML
1000 INJECTION, SOLUTION INTRAVENOUS
Refills: 0 | Status: DISCONTINUED | OUTPATIENT
Start: 2023-07-26 | End: 2023-07-26

## 2023-07-26 NOTE — ASU DISCHARGE PLAN (ADULT/PEDIATRIC) - CARE PROVIDER_API CALL
Jennifer Cook  Obstetrics and Gynecology  [address] [place] [address], [place] [address]  Phone: [phone]  Fax: [phone]  Follow Up Time:

## 2023-07-26 NOTE — ASU DISCHARGE PLAN (ADULT/PEDIATRIC) - NS MD DC FALL RISK RISK
For information on Fall & Injury Prevention, visit: [email or web address] OR  [email or web address] OR  https://www.cdc.gov/steadi/patient.html

## 2023-07-26 NOTE — PACU DISCHARGE NOTE - PAIN:
Bonilla pulled at 1800 per orders. Please bladder scan if no UOP in 6 hours.    Controlled with current regime

## 2023-07-26 NOTE — PRE-ANESTHESIA EVALUATION ADULT - NSANTHOSAYNRD_GEN_A_CORE
No. YASSINE screening performed.  STOP BANG Legend: 0-2 = LOW Risk; 3-4 = INTERMEDIATE Risk; 5-8 = HIGH Risk

## 2023-07-26 NOTE — BRIEF OPERATIVE NOTE - OPERATION/FINDINGS
cervix serially dilated to 16 dilator. Uterus significantly anteroverted. Large polyp noted in the uterine cavity. It was removed with myosure. Normal cavity at the endo of the procedure. Sharp D&C done. Minimal bleeding. Fluid deficit 100cc. Pt tolerated the procedure well.

## 2023-07-28 LAB — SURGICAL PATHOLOGY STUDY: SIGNIFICANT CHANGE UP

## 2023-08-08 ENCOUNTER — APPOINTMENT (OUTPATIENT)
Dept: SURGERY | Facility: HOSPITAL | Age: 48
End: 2023-08-08

## 2023-08-08 ENCOUNTER — APPOINTMENT (OUTPATIENT)
Dept: OBGYN | Facility: CLINIC | Age: 48
End: 2023-08-08

## 2023-08-08 PROBLEM — N93.9 ABNORMAL UTERINE AND VAGINAL BLEEDING, UNSPECIFIED: Chronic | Status: ACTIVE | Noted: 2023-07-25

## 2023-08-08 PROBLEM — F41.9 ANXIETY DISORDER, UNSPECIFIED: Chronic | Status: ACTIVE | Noted: 2023-07-25

## 2024-04-11 NOTE — ASU PATIENT PROFILE, ADULT - TEACHING/LEARNING FACTORS INFLUENCE READINESS TO LEARN
From: Kerry ACHARYA  To: Anthony GARCIA  Sent: 4/11/2024 1:28 PM CDT  Subject: Appointment today 4/11/24 with Dr. Les Ernst,    I did just try to call you & left you a voicemail. We just wanted to see if you can come in at either 2:30PM or 4PM for your ob check today. You can either call us at [phone] or respond to this message.    Thanks,    The Nursing Staff   
anxiety

## 2025-02-24 ENCOUNTER — APPOINTMENT (OUTPATIENT)
Dept: OBGYN | Facility: CLINIC | Age: 50
End: 2025-02-24

## 2025-02-24 VITALS
DIASTOLIC BLOOD PRESSURE: 75 MMHG | BODY MASS INDEX: 21.31 KG/M2 | SYSTOLIC BLOOD PRESSURE: 111 MMHG | HEART RATE: 87 BPM | OXYGEN SATURATION: 96 % | WEIGHT: 132 LBS

## 2025-04-03 ENCOUNTER — APPOINTMENT (OUTPATIENT)
Dept: OBGYN | Facility: CLINIC | Age: 50
End: 2025-04-03

## 2025-04-03 VITALS
OXYGEN SATURATION: 98 % | SYSTOLIC BLOOD PRESSURE: 121 MMHG | DIASTOLIC BLOOD PRESSURE: 78 MMHG | HEART RATE: 85 BPM | BODY MASS INDEX: 21.31 KG/M2 | WEIGHT: 132 LBS

## 2025-04-03 DIAGNOSIS — Z12.39 ENCOUNTER FOR OTHER SCREENING FOR MALIGNANT NEOPLASM OF BREAST: ICD-10-CM

## 2025-04-03 DIAGNOSIS — Z12.4 ENCOUNTER FOR SCREENING FOR MALIGNANT NEOPLASM OF CERVIX: ICD-10-CM

## 2025-04-03 DIAGNOSIS — Z12.11 ENCOUNTER FOR SCREENING FOR MALIGNANT NEOPLASM OF COLON: ICD-10-CM

## 2025-04-03 PROCEDURE — 99459 PELVIC EXAMINATION: CPT

## 2025-04-03 PROCEDURE — 99396 PREV VISIT EST AGE 40-64: CPT

## 2025-04-04 ENCOUNTER — NON-APPOINTMENT (OUTPATIENT)
Age: 50
End: 2025-04-04

## 2025-04-07 LAB — HPV HIGH+LOW RISK DNA PNL CVX: NOT DETECTED

## 2025-04-09 LAB — CYTOLOGY CVX/VAG DOC THIN PREP: NORMAL

## (undated) DEVICE — DRSG TELFA 3 X 8

## (undated) DEVICE — GLV 7 PROTEXIS (WHITE)

## (undated) DEVICE — PACK D&C

## (undated) DEVICE — POSITIONER FOAM EGG CRATE ULNAR 2PCS (PINK)

## (undated) DEVICE — DRAPE TOWEL BLUE 17" X 24"

## (undated) DEVICE — SOL IRR BAG NS 0.9% 3000ML

## (undated) DEVICE — GLV 6.5 PROTEXIS (WHITE)

## (undated) DEVICE — WARMING BLANKET UPPER ADULT

## (undated) DEVICE — MYOSURE SCOPE SEAL

## (undated) DEVICE — VENODYNE/SCD SLEEVE CALF MEDIUM